# Patient Record
Sex: MALE | Race: WHITE
[De-identification: names, ages, dates, MRNs, and addresses within clinical notes are randomized per-mention and may not be internally consistent; named-entity substitution may affect disease eponyms.]

---

## 2018-10-29 ENCOUNTER — HOSPITAL ENCOUNTER (OUTPATIENT)
Dept: HOSPITAL 19 - COL.CAR | Age: 73
LOS: 1 days | Discharge: HOME | End: 2018-10-30
Attending: INTERNAL MEDICINE
Payer: MEDICARE

## 2018-10-29 VITALS — OXYGEN SATURATION: 97 %

## 2018-10-29 VITALS — OXYGEN SATURATION: 98 %

## 2018-10-29 VITALS — OXYGEN SATURATION: 99 %

## 2018-10-29 VITALS — DIASTOLIC BLOOD PRESSURE: 55 MMHG | OXYGEN SATURATION: 97 % | HEART RATE: 55 BPM | SYSTOLIC BLOOD PRESSURE: 129 MMHG

## 2018-10-29 VITALS — OXYGEN SATURATION: 100 %

## 2018-10-29 VITALS
SYSTOLIC BLOOD PRESSURE: 126 MMHG | OXYGEN SATURATION: 98 % | TEMPERATURE: 97.8 F | DIASTOLIC BLOOD PRESSURE: 55 MMHG | HEART RATE: 64 BPM

## 2018-10-29 VITALS — DIASTOLIC BLOOD PRESSURE: 62 MMHG | OXYGEN SATURATION: 98 % | SYSTOLIC BLOOD PRESSURE: 140 MMHG | HEART RATE: 63 BPM

## 2018-10-29 VITALS — HEART RATE: 62 BPM | DIASTOLIC BLOOD PRESSURE: 61 MMHG | OXYGEN SATURATION: 96 % | SYSTOLIC BLOOD PRESSURE: 119 MMHG

## 2018-10-29 VITALS — OXYGEN SATURATION: 84 %

## 2018-10-29 VITALS — OXYGEN SATURATION: 96 %

## 2018-10-29 VITALS — OXYGEN SATURATION: 94 %

## 2018-10-29 VITALS — HEART RATE: 61 BPM | DIASTOLIC BLOOD PRESSURE: 67 MMHG | OXYGEN SATURATION: 98 % | SYSTOLIC BLOOD PRESSURE: 132 MMHG

## 2018-10-29 VITALS — OXYGEN SATURATION: 95 %

## 2018-10-29 VITALS — DIASTOLIC BLOOD PRESSURE: 67 MMHG | SYSTOLIC BLOOD PRESSURE: 139 MMHG | HEART RATE: 62 BPM

## 2018-10-29 VITALS — OXYGEN SATURATION: 98 % | SYSTOLIC BLOOD PRESSURE: 132 MMHG | DIASTOLIC BLOOD PRESSURE: 66 MMHG | HEART RATE: 62 BPM

## 2018-10-29 VITALS — OXYGEN SATURATION: 89 %

## 2018-10-29 VITALS — OXYGEN SATURATION: 93 %

## 2018-10-29 VITALS — SYSTOLIC BLOOD PRESSURE: 150 MMHG | HEART RATE: 54 BPM | DIASTOLIC BLOOD PRESSURE: 72 MMHG

## 2018-10-29 VITALS — OXYGEN SATURATION: 90 %

## 2018-10-29 VITALS — DIASTOLIC BLOOD PRESSURE: 70 MMHG | SYSTOLIC BLOOD PRESSURE: 142 MMHG | OXYGEN SATURATION: 99 % | HEART RATE: 60 BPM

## 2018-10-29 VITALS — SYSTOLIC BLOOD PRESSURE: 148 MMHG | DIASTOLIC BLOOD PRESSURE: 57 MMHG | HEART RATE: 57 BPM

## 2018-10-29 VITALS — DIASTOLIC BLOOD PRESSURE: 39 MMHG | HEART RATE: 64 BPM | TEMPERATURE: 97.8 F | SYSTOLIC BLOOD PRESSURE: 111 MMHG

## 2018-10-29 VITALS — DIASTOLIC BLOOD PRESSURE: 53 MMHG | TEMPERATURE: 97.5 F | HEART RATE: 62 BPM | SYSTOLIC BLOOD PRESSURE: 134 MMHG

## 2018-10-29 VITALS — WEIGHT: 212.97 LBS | HEIGHT: 69.02 IN | BODY MASS INDEX: 31.54 KG/M2

## 2018-10-29 VITALS — OXYGEN SATURATION: 98 % | DIASTOLIC BLOOD PRESSURE: 62 MMHG | SYSTOLIC BLOOD PRESSURE: 127 MMHG | HEART RATE: 60 BPM

## 2018-10-29 VITALS — OXYGEN SATURATION: 91 %

## 2018-10-29 VITALS — OXYGEN SATURATION: 85 %

## 2018-10-29 DIAGNOSIS — E78.5: ICD-10-CM

## 2018-10-29 DIAGNOSIS — R06.02: ICD-10-CM

## 2018-10-29 DIAGNOSIS — I73.9: ICD-10-CM

## 2018-10-29 DIAGNOSIS — Z79.899: ICD-10-CM

## 2018-10-29 DIAGNOSIS — I25.10: Primary | ICD-10-CM

## 2018-10-29 DIAGNOSIS — I10: ICD-10-CM

## 2018-10-29 DIAGNOSIS — Z79.02: ICD-10-CM

## 2018-10-29 DIAGNOSIS — Z95.5: ICD-10-CM

## 2018-10-29 DIAGNOSIS — E11.9: ICD-10-CM

## 2018-10-29 DIAGNOSIS — I70.201: ICD-10-CM

## 2018-10-29 DIAGNOSIS — Z79.82: ICD-10-CM

## 2018-10-29 LAB
ANION GAP SERPL CALC-SCNC: 6 MMOL/L (ref 7–16)
BUN SERPL-MCNC: 20 MG/DL (ref 9–20)
CALCIUM SERPL-MCNC: 9.4 MG/DL (ref 8.4–10.2)
CHLORIDE SERPL-SCNC: 104 MMOL/L (ref 98–107)
CO2 SERPL-SCNC: 30 MMOL/L (ref 22–30)
CREAT SERPL-SCNC: 0.92 MG/DL (ref 0.66–1.25)
ERYTHROCYTE [DISTWIDTH] IN BLOOD BY AUTOMATED COUNT: 12.7 % (ref 11.5–14.5)
GLUCOSE SERPL-MCNC: 136 MG/DL (ref 74–106)
HCT VFR BLD AUTO: 40.4 % (ref 42–52)
HGB BLD-MCNC: 13.4 G/DL (ref 13.5–18)
INR BLD: 1 (ref 0.8–3)
MCH RBC QN AUTO: 32 PG (ref 27–31)
MCHC RBC AUTO-ENTMCNC: 33 G/DL (ref 33–37)
MCV RBC AUTO: 95 FL (ref 80–100)
PLATELET # BLD AUTO: 165 K/MM3 (ref 130–400)
PMV BLD AUTO: 9.9 FL (ref 7.4–10.4)
POTASSIUM SERPL-SCNC: 4.3 MMOL/L (ref 3.4–5)
PROTHROMBIN TIME: 11 SECONDS (ref 9.7–12.8)
RBC # BLD AUTO: 4.24 M/MM3 (ref 4.2–5.6)
SODIUM SERPL-SCNC: 139 MMOL/L (ref 137–145)

## 2018-10-29 PROCEDURE — C9601 PERC DRUG-EL COR STENT BRAN: HCPCS

## 2018-10-29 PROCEDURE — C9600 PERC DRUG-EL COR STENT SING: HCPCS

## 2018-10-29 PROCEDURE — C1874 STENT, COATED/COV W/DEL SYS: HCPCS

## 2018-10-29 PROCEDURE — C1887 CATHETER, GUIDING: HCPCS

## 2018-10-29 PROCEDURE — C1760 CLOSURE DEV, VASC: HCPCS

## 2018-10-29 PROCEDURE — C1769 GUIDE WIRE: HCPCS

## 2018-10-29 PROCEDURE — C1725 CATH, TRANSLUMIN NON-LASER: HCPCS

## 2018-10-29 PROCEDURE — C1894 INTRO/SHEATH, NON-LASER: HCPCS

## 2018-10-30 VITALS — HEART RATE: 67 BPM | SYSTOLIC BLOOD PRESSURE: 127 MMHG | DIASTOLIC BLOOD PRESSURE: 71 MMHG | TEMPERATURE: 98.3 F

## 2018-10-30 VITALS — TEMPERATURE: 97.6 F | SYSTOLIC BLOOD PRESSURE: 142 MMHG | HEART RATE: 62 BPM | DIASTOLIC BLOOD PRESSURE: 80 MMHG

## 2018-10-30 VITALS — DIASTOLIC BLOOD PRESSURE: 52 MMHG | TEMPERATURE: 97.4 F | HEART RATE: 62 BPM | SYSTOLIC BLOOD PRESSURE: 110 MMHG

## 2018-10-30 LAB
ANION GAP SERPL CALC-SCNC: 5 MMOL/L (ref 7–16)
BASOPHILS # BLD: 0 10*3/UL (ref 0–0.2)
BASOPHILS NFR BLD AUTO: 0.7 % (ref 0–2)
BUN SERPL-MCNC: 17 MG/DL (ref 9–20)
CALCIUM SERPL-MCNC: 8.8 MG/DL (ref 8.4–10.2)
CHLORIDE SERPL-SCNC: 100 MMOL/L (ref 98–107)
CK SERPL-CCNC: 82 U/L (ref 55–170)
CO2 SERPL-SCNC: 32 MMOL/L (ref 22–30)
CREAT SERPL-SCNC: 0.92 MG/DL (ref 0.66–1.25)
EOSINOPHIL # BLD: 0.2 10*3/UL (ref 0–0.7)
EOSINOPHIL NFR BLD: 3.6 % (ref 0–4)
ERYTHROCYTE [DISTWIDTH] IN BLOOD BY AUTOMATED COUNT: 12.9 % (ref 11.5–14.5)
GLUCOSE SERPL-MCNC: 119 MG/DL (ref 74–106)
GRANULOCYTES # BLD AUTO: 53.3 % (ref 42.2–75.2)
HCT VFR BLD AUTO: 35.8 % (ref 42–52)
HGB BLD-MCNC: 11.9 G/DL (ref 13.5–18)
LYMPHOCYTES # BLD: 1.9 10*3/UL (ref 1.2–3.4)
LYMPHOCYTES NFR BLD: 30.4 % (ref 20–51)
MCH RBC QN AUTO: 32 PG (ref 27–31)
MCHC RBC AUTO-ENTMCNC: 33 G/DL (ref 33–37)
MCV RBC AUTO: 96 FL (ref 80–100)
MONOCYTES # BLD: 0.7 10*3/UL (ref 0.1–0.6)
MONOCYTES NFR BLD AUTO: 11.8 % (ref 1.7–9.3)
NEUTROPHILS # BLD: 3.3 10*3/UL (ref 1.4–6.5)
PLATELET # BLD AUTO: 137 K/MM3 (ref 130–400)
PMV BLD AUTO: 9.6 FL (ref 7.4–10.4)
POTASSIUM SERPL-SCNC: 3.8 MMOL/L (ref 3.4–5)
RBC # BLD AUTO: 3.74 M/MM3 (ref 4.2–5.6)
SODIUM SERPL-SCNC: 137 MMOL/L (ref 137–145)

## 2020-09-28 ENCOUNTER — HOSPITAL ENCOUNTER (OUTPATIENT)
Dept: HOSPITAL 19 - COL.CAR | Age: 75
LOS: 1 days | Discharge: HOME | End: 2020-09-29
Attending: INTERNAL MEDICINE
Payer: MEDICARE

## 2020-09-28 VITALS — OXYGEN SATURATION: 94 %

## 2020-09-28 VITALS — OXYGEN SATURATION: 96 %

## 2020-09-28 VITALS — OXYGEN SATURATION: 98 %

## 2020-09-28 VITALS — HEIGHT: 69.02 IN | WEIGHT: 201.72 LBS | BODY MASS INDEX: 29.88 KG/M2

## 2020-09-28 VITALS — OXYGEN SATURATION: 97 %

## 2020-09-28 VITALS — SYSTOLIC BLOOD PRESSURE: 115 MMHG | TEMPERATURE: 97.3 F | HEART RATE: 52 BPM | DIASTOLIC BLOOD PRESSURE: 52 MMHG

## 2020-09-28 VITALS — HEART RATE: 59 BPM | OXYGEN SATURATION: 98 % | DIASTOLIC BLOOD PRESSURE: 56 MMHG | SYSTOLIC BLOOD PRESSURE: 130 MMHG

## 2020-09-28 VITALS — OXYGEN SATURATION: 100 %

## 2020-09-28 VITALS — OXYGEN SATURATION: 95 %

## 2020-09-28 VITALS — SYSTOLIC BLOOD PRESSURE: 136 MMHG | DIASTOLIC BLOOD PRESSURE: 56 MMHG | HEART RATE: 66 BPM

## 2020-09-28 VITALS
DIASTOLIC BLOOD PRESSURE: 47 MMHG | TEMPERATURE: 97.6 F | HEART RATE: 70 BPM | OXYGEN SATURATION: 96 % | SYSTOLIC BLOOD PRESSURE: 125 MMHG

## 2020-09-28 VITALS — SYSTOLIC BLOOD PRESSURE: 112 MMHG | DIASTOLIC BLOOD PRESSURE: 82 MMHG | HEART RATE: 58 BPM

## 2020-09-28 VITALS — SYSTOLIC BLOOD PRESSURE: 126 MMHG | OXYGEN SATURATION: 97 % | DIASTOLIC BLOOD PRESSURE: 59 MMHG | HEART RATE: 52 BPM

## 2020-09-28 VITALS — OXYGEN SATURATION: 93 %

## 2020-09-28 VITALS — OXYGEN SATURATION: 99 %

## 2020-09-28 VITALS — DIASTOLIC BLOOD PRESSURE: 56 MMHG | TEMPERATURE: 97.6 F | HEART RATE: 59 BPM | SYSTOLIC BLOOD PRESSURE: 114 MMHG

## 2020-09-28 VITALS — DIASTOLIC BLOOD PRESSURE: 61 MMHG | HEART RATE: 52 BPM | SYSTOLIC BLOOD PRESSURE: 116 MMHG

## 2020-09-28 VITALS — DIASTOLIC BLOOD PRESSURE: 64 MMHG | OXYGEN SATURATION: 98 % | SYSTOLIC BLOOD PRESSURE: 137 MMHG | HEART RATE: 57 BPM

## 2020-09-28 VITALS — TEMPERATURE: 97.6 F | SYSTOLIC BLOOD PRESSURE: 136 MMHG | DIASTOLIC BLOOD PRESSURE: 56 MMHG | HEART RATE: 63 BPM

## 2020-09-28 VITALS — HEART RATE: 58 BPM | OXYGEN SATURATION: 97 % | SYSTOLIC BLOOD PRESSURE: 119 MMHG | DIASTOLIC BLOOD PRESSURE: 72 MMHG

## 2020-09-28 VITALS — SYSTOLIC BLOOD PRESSURE: 122 MMHG | HEART RATE: 60 BPM | DIASTOLIC BLOOD PRESSURE: 64 MMHG

## 2020-09-28 VITALS — DIASTOLIC BLOOD PRESSURE: 60 MMHG | SYSTOLIC BLOOD PRESSURE: 119 MMHG | HEART RATE: 54 BPM

## 2020-09-28 VITALS — SYSTOLIC BLOOD PRESSURE: 136 MMHG | DIASTOLIC BLOOD PRESSURE: 56 MMHG | HEART RATE: 63 BPM

## 2020-09-28 VITALS — OXYGEN SATURATION: 92 %

## 2020-09-28 VITALS
DIASTOLIC BLOOD PRESSURE: 52 MMHG | TEMPERATURE: 97.3 F | SYSTOLIC BLOOD PRESSURE: 115 MMHG | HEART RATE: 60 BPM | OXYGEN SATURATION: 97 %

## 2020-09-28 DIAGNOSIS — T82.855A: Primary | ICD-10-CM

## 2020-09-28 DIAGNOSIS — E78.5: ICD-10-CM

## 2020-09-28 DIAGNOSIS — Z88.1: ICD-10-CM

## 2020-09-28 DIAGNOSIS — Z79.01: ICD-10-CM

## 2020-09-28 DIAGNOSIS — Z79.84: ICD-10-CM

## 2020-09-28 DIAGNOSIS — G47.33: ICD-10-CM

## 2020-09-28 DIAGNOSIS — G89.29: ICD-10-CM

## 2020-09-28 DIAGNOSIS — Z95.828: ICD-10-CM

## 2020-09-28 DIAGNOSIS — I73.9: ICD-10-CM

## 2020-09-28 DIAGNOSIS — E11.9: ICD-10-CM

## 2020-09-28 DIAGNOSIS — Z85.46: ICD-10-CM

## 2020-09-28 DIAGNOSIS — I25.110: ICD-10-CM

## 2020-09-28 DIAGNOSIS — I25.2: ICD-10-CM

## 2020-09-28 DIAGNOSIS — K21.9: ICD-10-CM

## 2020-09-28 DIAGNOSIS — Z79.899: ICD-10-CM

## 2020-09-28 DIAGNOSIS — G25.81: ICD-10-CM

## 2020-09-28 DIAGNOSIS — D50.9: ICD-10-CM

## 2020-09-28 DIAGNOSIS — I10: ICD-10-CM

## 2020-09-28 DIAGNOSIS — Z87.891: ICD-10-CM

## 2020-09-28 DIAGNOSIS — I35.9: ICD-10-CM

## 2020-09-28 DIAGNOSIS — Z79.82: ICD-10-CM

## 2020-09-28 LAB
ANION GAP SERPL CALC-SCNC: 7 MMOL/L (ref 7–16)
APTT PPP: 39.4 SECONDS (ref 26–37)
BUN SERPL-MCNC: 30 MG/DL (ref 9–20)
CALCIUM SERPL-MCNC: 9.2 MG/DL (ref 8.4–10.2)
CHLORIDE SERPL-SCNC: 105 MMOL/L (ref 98–107)
CO2 SERPL-SCNC: 27 MMOL/L (ref 22–30)
CREAT SERPL-SCNC: 1.05 UMOL/L (ref 0.66–1.25)
ERYTHROCYTE [DISTWIDTH] IN BLOOD BY AUTOMATED COUNT: 13.2 % (ref 11.5–14.5)
GLUCOSE SERPL-MCNC: 123 MG/DL (ref 74–106)
HCT VFR BLD AUTO: 36.4 % (ref 42–52)
HGB BLD-MCNC: 12.1 G/DL (ref 13.5–18)
INR BLD: 1 (ref 0.8–3)
MCH RBC QN AUTO: 32 PG (ref 27–31)
MCHC RBC AUTO-ENTMCNC: 33 G/DL (ref 33–37)
MCV RBC AUTO: 97 FL (ref 80–100)
PLATELET # BLD AUTO: 174 K/MM3 (ref 130–400)
PMV BLD AUTO: 9.8 FL (ref 7.4–10.4)
POTASSIUM SERPL-SCNC: 4.3 MMOL/L (ref 3.4–5)
PROTHROMBIN TIME: 11.3 SECONDS (ref 9.7–12.8)
RBC # BLD AUTO: 3.77 M/MM3 (ref 4.2–5.6)
SODIUM SERPL-SCNC: 139 MMOL/L (ref 137–145)

## 2020-09-28 NOTE — NUR
SEE MERGE DOCUMENTATION FOR MEDICATION ADMINISTRATION TIMES AND INTRA/POST
PROCEDURE SEDATION ASSESSMENTS. PLAN FOR RIGHT FEMORAL ACCESS.

## 2020-09-28 NOTE — NUR
Resting in bed at this time. Denies any pain or shortness of air.  Groin site
checked. Soft, and non-tender with scant drainage.  Call light left within
reach.

## 2020-09-29 VITALS — OXYGEN SATURATION: 93 %

## 2020-09-29 VITALS — OXYGEN SATURATION: 97 %

## 2020-09-29 VITALS — OXYGEN SATURATION: 95 %

## 2020-09-29 VITALS — OXYGEN SATURATION: 96 %

## 2020-09-29 VITALS — OXYGEN SATURATION: 94 %

## 2020-09-29 VITALS — OXYGEN SATURATION: 98 % | SYSTOLIC BLOOD PRESSURE: 123 MMHG | DIASTOLIC BLOOD PRESSURE: 46 MMHG

## 2020-09-29 VITALS — OXYGEN SATURATION: 98 %

## 2020-09-29 VITALS — OXYGEN SATURATION: 99 %

## 2020-09-29 VITALS
DIASTOLIC BLOOD PRESSURE: 60 MMHG | SYSTOLIC BLOOD PRESSURE: 120 MMHG | HEART RATE: 69 BPM | OXYGEN SATURATION: 95 % | TEMPERATURE: 97.6 F

## 2020-09-29 VITALS — TEMPERATURE: 98 F | OXYGEN SATURATION: 96 %

## 2020-09-29 VITALS — HEART RATE: 71 BPM | TEMPERATURE: 97.8 F | DIASTOLIC BLOOD PRESSURE: 57 MMHG | SYSTOLIC BLOOD PRESSURE: 136 MMHG

## 2020-09-29 VITALS — DIASTOLIC BLOOD PRESSURE: 46 MMHG | HEART RATE: 77 BPM | TEMPERATURE: 97.8 F | SYSTOLIC BLOOD PRESSURE: 114 MMHG

## 2020-09-29 VITALS — OXYGEN SATURATION: 92 %

## 2020-09-29 VITALS — OXYGEN SATURATION: 91 %

## 2020-09-29 LAB
ANION GAP SERPL CALC-SCNC: 7 MMOL/L (ref 7–16)
BASOPHILS # BLD: 0 10*3/UL (ref 0–0.2)
BASOPHILS NFR BLD AUTO: 0.5 % (ref 0–2)
BUN SERPL-MCNC: 26 MG/DL (ref 9–20)
CALCIUM SERPL-MCNC: 8.8 MG/DL (ref 8.4–10.2)
CHLORIDE SERPL-SCNC: 102 MMOL/L (ref 98–107)
CO2 SERPL-SCNC: 28 MMOL/L (ref 22–30)
CREAT SERPL-SCNC: 1.08 UMOL/L (ref 0.66–1.25)
EOSINOPHIL # BLD: 0.2 10*3/UL (ref 0–0.7)
EOSINOPHIL NFR BLD: 3.2 % (ref 0–4)
ERYTHROCYTE [DISTWIDTH] IN BLOOD BY AUTOMATED COUNT: 12.9 % (ref 11.5–14.5)
GLUCOSE SERPL-MCNC: 109 MG/DL (ref 74–106)
GRANULOCYTES # BLD AUTO: 57.9 % (ref 42.2–75.2)
HCT VFR BLD AUTO: 35.1 % (ref 42–52)
HGB BLD-MCNC: 11.7 G/DL (ref 13.5–18)
LYMPHOCYTES # BLD: 2 10*3/UL (ref 1.2–3.4)
LYMPHOCYTES NFR BLD: 30.3 % (ref 20–51)
MCH RBC QN AUTO: 32 PG (ref 27–31)
MCHC RBC AUTO-ENTMCNC: 33 G/DL (ref 33–37)
MCV RBC AUTO: 97 FL (ref 80–100)
MONOCYTES # BLD: 0.5 10*3/UL (ref 0.1–0.6)
MONOCYTES NFR BLD AUTO: 7.9 % (ref 1.7–9.3)
NEUTROPHILS # BLD: 3.8 10*3/UL (ref 1.4–6.5)
PLATELET # BLD AUTO: 171 K/MM3 (ref 130–400)
PMV BLD AUTO: 10.1 FL (ref 7.4–10.4)
POTASSIUM SERPL-SCNC: 4.3 MMOL/L (ref 3.4–5)
RBC # BLD AUTO: 3.63 M/MM3 (ref 4.2–5.6)
SODIUM SERPL-SCNC: 137 MMOL/L (ref 137–145)

## 2020-09-29 NOTE — NUR
Plan to return home with friend Jesica Zhao [phone].
Patient reports that he resides in  independently. Patient has family
involved in care; Son Julian Sandra [phone], DTR Juana Lorenzodariel [phone]
[phone], and Sarkis Tobias [phone]. Patient reports that he is
unstable on his feet and uses an electronic wheelchair to get around his home.
Patient reports that he has a walker, cane, and CPAP.  Patient indicated that
he does have difficulty getting around his home. Patient indicaited that he
uses friendfund [facility] for RX or [facility]. PCP is Dr. Urbina and [facility] inf[facility]. Patient reports that he is able to transport himself. Declined
homehealth setup. Patient denies having any additonal care concerns.
SW educated patient on supports and community services. Additional need
identified for home health supports, services declined at this time. Nothing
Follows.

## 2020-09-29 NOTE — NUR
Reporting back pain and difficulty sleeping.  Reports that he has chronic back
pain. Administered PRN tylenol and melatonin.  Assisted patient to move to a
recliner for comfort.  Reports back feels better in the chair. Call light left
within reach. Will continue to monitor.